# Patient Record
Sex: MALE | URBAN - METROPOLITAN AREA
[De-identification: names, ages, dates, MRNs, and addresses within clinical notes are randomized per-mention and may not be internally consistent; named-entity substitution may affect disease eponyms.]

---

## 2020-12-22 ENCOUNTER — OFFICE VISIT (OUTPATIENT)
Dept: URBAN - METROPOLITAN AREA CLINIC 49 | Facility: CLINIC | Age: 80
End: 2020-12-22
Payer: COMMERCIAL

## 2020-12-22 DIAGNOSIS — H43.813 VITREOUS DEGENERATION, BILATERAL: ICD-10-CM

## 2020-12-22 DIAGNOSIS — H35.3223 EXUDATIVE AGE-RELATED MACULAR DEGENERATION OF LEFT EYE W/ INACTIVE SCAR: ICD-10-CM

## 2020-12-22 DIAGNOSIS — H25.13 AGE-RELATED NUCLEAR CATARACT, BILATERAL: ICD-10-CM

## 2020-12-22 DIAGNOSIS — H35.3112 NONEXUDATIVE AGE-RELATED MACULAR DEGENERATION, RIGHT EYE, INTERMEDIATE DRY STAGE: Primary | ICD-10-CM

## 2020-12-22 PROCEDURE — 92134 CPTRZ OPH DX IMG PST SGM RTA: CPT | Performed by: OPHTHALMOLOGY

## 2020-12-22 PROCEDURE — 99204 OFFICE O/P NEW MOD 45 MIN: CPT | Performed by: OPHTHALMOLOGY

## 2020-12-22 ASSESSMENT — INTRAOCULAR PRESSURE
OS: 12
OD: 10

## 2020-12-22 NOTE — IMPRESSION/PLAN
Impression: Nonexudative age-related macular degeneration, right eye, intermediate dry stage: H35.3112. Plan: The patient has dry age-related macular degeneration (AMD). OCT shows no IRF/SRF OU and subretinal scar OS. We discussed dry vs wet AMD, and I explained to the patient that currently there is no retinal treatment for dry AMD at this time. I recommend the patient take the AREDS formula anti-oxidant vitamins and to continue weekly self-monitoring for progression with the amsler grid. The patient understands that smoking is the most significant modifiable risk factor for the development of advanced AMD, and also understands that if they do smoke, they cannot take high doses of vitamin A/beta-carotene, since it may increase their risk for lung cancer. If there are any changes on the amsler grid, distortion or decreased vision, the patient was encouraged to contact our office immediately. thanks Dr. Anshu Mejia [facility] 6 months OCT OU; danni kowalski

## 2020-12-22 NOTE — IMPRESSION/PLAN
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 Plan: The patient has a disciform scar. I recommend observation as there is currently no effective treatment for subretinal scarring from a choroidal neovascular membrane (CNVM). The patient understands that further treatment would only be indicated to prevent enlargement of the central scotoma. I recommend the patient consider low vision aids, and we discussed the importance of yearly dilated eye exams.